# Patient Record
Sex: MALE | Race: WHITE | NOT HISPANIC OR LATINO | Employment: FULL TIME | URBAN - METROPOLITAN AREA
[De-identification: names, ages, dates, MRNs, and addresses within clinical notes are randomized per-mention and may not be internally consistent; named-entity substitution may affect disease eponyms.]

---

## 2020-01-24 ENCOUNTER — OFFICE VISIT (OUTPATIENT)
Dept: FAMILY MEDICINE | Facility: CLINIC | Age: 55
End: 2020-01-24
Payer: MEDICAID

## 2020-01-24 VITALS
BODY MASS INDEX: 33.55 KG/M2 | TEMPERATURE: 99 F | SYSTOLIC BLOOD PRESSURE: 140 MMHG | OXYGEN SATURATION: 98 % | DIASTOLIC BLOOD PRESSURE: 80 MMHG | HEIGHT: 72 IN | HEART RATE: 97 BPM | WEIGHT: 247.69 LBS

## 2020-01-24 DIAGNOSIS — R55 SYNCOPE, UNSPECIFIED SYNCOPE TYPE: ICD-10-CM

## 2020-01-24 DIAGNOSIS — R42 DIZZINESS: ICD-10-CM

## 2020-01-24 DIAGNOSIS — Z76.89 ENCOUNTER TO ESTABLISH CARE: ICD-10-CM

## 2020-01-24 DIAGNOSIS — R00.2 PALPITATIONS: ICD-10-CM

## 2020-01-24 DIAGNOSIS — E78.5 HYPERLIPIDEMIA, UNSPECIFIED HYPERLIPIDEMIA TYPE: ICD-10-CM

## 2020-01-24 DIAGNOSIS — I10 ESSENTIAL HYPERTENSION: Primary | ICD-10-CM

## 2020-01-24 PROCEDURE — 93000 PR ELECTROCARDIOGRAM, COMPLETE: ICD-10-PCS | Mod: S$GLB,,, | Performed by: NURSE PRACTITIONER

## 2020-01-24 PROCEDURE — 93000 ELECTROCARDIOGRAM COMPLETE: CPT | Mod: S$GLB,,, | Performed by: NURSE PRACTITIONER

## 2020-01-24 PROCEDURE — 99204 PR OFFICE/OUTPT VISIT, NEW, LEVL IV, 45-59 MIN: ICD-10-PCS | Mod: S$GLB,,, | Performed by: NURSE PRACTITIONER

## 2020-01-24 PROCEDURE — 99204 OFFICE O/P NEW MOD 45 MIN: CPT | Mod: S$GLB,,, | Performed by: NURSE PRACTITIONER

## 2020-01-24 RX ORDER — LISINOPRIL AND HYDROCHLOROTHIAZIDE 12.5; 2 MG/1; MG/1
1 TABLET ORAL DAILY
COMMUNITY
Start: 2020-01-15 | End: 2020-10-02 | Stop reason: SDUPTHER

## 2020-01-24 RX ORDER — ROSUVASTATIN CALCIUM 20 MG/1
20 TABLET, COATED ORAL DAILY
COMMUNITY
Start: 2019-10-28 | End: 2021-08-25

## 2020-01-24 RX ORDER — ASPIRIN 81 MG/1
81 TABLET ORAL
COMMUNITY
End: 2021-08-25

## 2020-01-24 NOTE — PROGRESS NOTES
"Clinic Note  1/24/2020      Subjective:       Patient ID:  Ruslan is a 54 y.o. male being seen in office today to establish care.       Chief Complaint: Establish Care    Mr. Dozier is here today with his mother at side to establish care. His previous PCP was Dr. Langston at [facility]. Hx of HTN, hyperlipidemia. He is here today with complaints of his "heart racing", dizziness, and fainting spells. He states these episodes happen when he exerts himself but also happen with no warning while sitting. He also reports nausea associated with dizziness. He states his pulse will go from "normal to 130" for no reason at all.  He reports these issues have been going on for around two years but he started seeing someone about them in August 2019. He reports he has had a brain MRI, Abd CT, nuclear stress test, tilt table test, echocardiogram, and blood work at [facility] and was told everything was normal. Recent lab work performed at [facility]. Cardiologist is Dr. Prado. He has also been seen at [facility] and [facility] for same complaints and discharged home with no explanation for symptoms. He has been instructed not to drive by prior PCP and is currently unable to work due to his condition. He is requesting to have a coronary angiogram and second opinion. He also says he was referred to an ENT but never received an appointment. Recent eye exam; needs cataract surgery but cannot be cleared due to symptoms.     Family History   Problem Relation Age of Onset    Diabetes Mother     Heart disease Father      Social History     Socioeconomic History    Marital status:      Spouse name: Not on file    Number of children: Not on file    Years of education: Not on file    Highest education level: Not on file   Occupational History    Not on file   Social Needs    Financial resource strain: Not on file    Food insecurity:     Worry: Not on file     Inability: Not on file    Transportation needs:     " Medical: Not on file     Non-medical: Not on file   Tobacco Use    Smoking status: Smoker, Current Status Unknown    Smokeless tobacco: Never Used   Substance and Sexual Activity    Alcohol use: Not Currently    Drug use: Not Currently    Sexual activity: Not on file   Lifestyle    Physical activity:     Days per week: Not on file     Minutes per session: Not on file    Stress: Not on file   Relationships    Social connections:     Talks on phone: Not on file     Gets together: Not on file     Attends Taoist service: Not on file     Active member of club or organization: Not on file     Attends meetings of clubs or organizations: Not on file     Relationship status: Not on file   Other Topics Concern    Not on file   Social History Narrative    Not on file     History reviewed. No pertinent surgical history.  Social History     Substance and Sexual Activity   Alcohol Use Not Currently     Patient has no known allergies.  Medication List with Changes/Refills   Current Medications    ASPIRIN (ECOTRIN) 81 MG EC TABLET    Take 81 mg by mouth once daily.    LISINOPRIL-HYDROCHLOROTHIAZIDE (PRINZIDE,ZESTORETIC) 20-12.5 MG PER TABLET    Take 1 tablet by mouth once daily.     ROSUVASTATIN (CRESTOR) 20 MG TABLET    Take 20 mg by mouth once daily.        Review of Systems   Constitutional: Negative for chills, fever and weight loss.   HENT: Positive for hearing loss. Negative for congestion, sinus pain and sore throat.    Eyes: Negative for blurred vision, double vision and photophobia.   Respiratory: Negative for cough, shortness of breath and wheezing.    Cardiovascular: Positive for palpitations. Negative for chest pain and leg swelling.   Gastrointestinal: Negative for abdominal pain, blood in stool, constipation, diarrhea, heartburn, nausea and vomiting.   Genitourinary: Negative for frequency and urgency.   Musculoskeletal: Negative for falls, joint pain and neck pain.   Skin: Negative for rash.    Neurological: Positive for dizziness. Negative for tingling, tremors, sensory change, speech change, focal weakness, seizures, loss of consciousness, weakness and headaches.   Psychiatric/Behavioral: Negative for depression, memory loss and suicidal ideas.             BP (!) 140/80 (BP Location: Right arm, Patient Position: Sitting, BP Method: Large (Manual))   Pulse 97   Temp 99 °F (37.2 °C) (Oral)   Ht 6' (1.829 m)   Wt 112.4 kg (247 lb 11.2 oz)   SpO2 98%   BMI 33.59 kg/m²   Estimated body mass index is 33.59 kg/m² as calculated from the following:    Height as of this encounter: 6' (1.829 m).    Weight as of this encounter: 112.4 kg (247 lb 11.2 oz).    Objective:        Physical Exam   Constitutional: He is oriented to person, place, and time and well-developed, well-nourished, and in no distress.   HENT:   Head: Normocephalic and atraumatic.   Right Ear: Tympanic membrane, external ear and ear canal normal.   Left Ear: Tympanic membrane, external ear and ear canal normal.   Nose: Nose normal.   Mouth/Throat: Uvula is midline, oropharynx is clear and moist and mucous membranes are normal.   Eyes: Pupils are equal, round, and reactive to light. Conjunctivae and EOM are normal.   Neck: Trachea normal and normal range of motion. Neck supple. No JVD present. Carotid bruit is not present.   Cardiovascular: Normal rate, regular rhythm, normal heart sounds and intact distal pulses. Exam reveals no gallop and no friction rub.   No murmur heard.  EKG in office: NSR   Pulmonary/Chest: Effort normal and breath sounds normal. No respiratory distress. He has no wheezes. He has no rhonchi. He has no rales.   Abdominal: Soft. Bowel sounds are normal. He exhibits no distension. There is no tenderness. There is no CVA tenderness.   Musculoskeletal: Normal range of motion. He exhibits no edema, tenderness or deformity.   Lymphadenopathy:     He has no cervical adenopathy.   Neurological: He is alert and oriented to  person, place, and time. He has normal motor skills, normal sensation, normal strength, normal reflexes and intact cranial nerves. No cranial nerve deficit. Gait normal. GCS score is 15.   Skin: Skin is warm, dry and intact. No rash noted. No erythema.   Psychiatric: Mood, memory, affect and judgment normal.   Nursing note and vitals reviewed.        Assessment and Plan:         Ruslan was seen today for establish care.    Diagnoses and all orders for this visit:    Essential hypertension  -     US Carotid Bilateral; Future  -     US Carotid Bilateral    Hyperlipidemia, unspecified hyperlipidemia type  -     US Carotid Bilateral; Future  -     US Carotid Bilateral    Encounter to establish care    Dizziness  -     US Carotid Bilateral; Future  -     US Carotid Bilateral    Syncope, unspecified syncope type  -     US Carotid Bilateral; Future  -     US Carotid Bilateral    Palpitations  -     IN OFFICE EKG 12-LEAD (to Muse); Future    Discussed with patient getting all medical records so we an review what all test have and have not been done. He is to request records from [facility] and [facility]. Also is to find out ENT physicians name that will accept Medicaid so we can send referral; discussed possible BPV/inner ear problem.  He cannot [facility] due to transportation issues.  Instructed to schedule follow-up once carotid U/S complete and all records received so we can develop further plan of care. Possible referral to Dr. Jeff/cardiology for 2nd opinion.   Pt would like to wait for all preventative screening, colonoscopy, etc until these current issues are resolved.  Given ER precautions. Patient and family verbalized understanding and agreed to treatment and plan of care.       Follow up:   Follow up in about 1 month (around 2/24/2020), or sooner if medical records obtained..            Hermila Santana NP

## 2020-01-27 DIAGNOSIS — R42 DIZZINESS: ICD-10-CM

## 2020-01-27 DIAGNOSIS — E78.5 HYPERLIPIDEMIA, UNSPECIFIED HYPERLIPIDEMIA TYPE: ICD-10-CM

## 2020-01-27 DIAGNOSIS — R55 SYNCOPE, UNSPECIFIED SYNCOPE TYPE: Primary | ICD-10-CM

## 2020-01-29 DIAGNOSIS — R42 DIZZINESS: ICD-10-CM

## 2020-01-29 DIAGNOSIS — R55 SYNCOPE, UNSPECIFIED SYNCOPE TYPE: Primary | ICD-10-CM

## 2020-01-30 ENCOUNTER — TELEPHONE (OUTPATIENT)
Dept: FAMILY MEDICINE | Facility: CLINIC | Age: 55
End: 2020-01-30

## 2020-01-30 NOTE — TELEPHONE ENCOUNTER
Pt called to advise he found an ENT at [facility]. Dr Grigsby and the fax is [phone]

## 2020-02-24 ENCOUNTER — OFFICE VISIT (OUTPATIENT)
Dept: FAMILY MEDICINE | Facility: CLINIC | Age: 55
End: 2020-02-24
Payer: MEDICAID

## 2020-02-24 VITALS
TEMPERATURE: 99 F | SYSTOLIC BLOOD PRESSURE: 138 MMHG | HEIGHT: 72 IN | OXYGEN SATURATION: 96 % | WEIGHT: 244.88 LBS | BODY MASS INDEX: 33.17 KG/M2 | DIASTOLIC BLOOD PRESSURE: 88 MMHG | HEART RATE: 99 BPM

## 2020-02-24 DIAGNOSIS — R00.2 HEART PALPITATIONS: ICD-10-CM

## 2020-02-24 DIAGNOSIS — I10 ESSENTIAL HYPERTENSION: Primary | ICD-10-CM

## 2020-02-24 DIAGNOSIS — R42 DIZZINESS: ICD-10-CM

## 2020-02-24 DIAGNOSIS — E78.5 HYPERLIPIDEMIA, UNSPECIFIED HYPERLIPIDEMIA TYPE: ICD-10-CM

## 2020-02-24 PROBLEM — Z76.89 ENCOUNTER TO ESTABLISH CARE: Status: RESOLVED | Noted: 2020-01-24 | Resolved: 2020-02-24

## 2020-02-24 PROCEDURE — 99213 OFFICE O/P EST LOW 20 MIN: CPT | Mod: S$GLB,,, | Performed by: NURSE PRACTITIONER

## 2020-02-24 PROCEDURE — 99213 PR OFFICE/OUTPT VISIT, EST, LEVL III, 20-29 MIN: ICD-10-PCS | Mod: S$GLB,,, | Performed by: NURSE PRACTITIONER

## 2020-02-24 NOTE — PROGRESS NOTES
"Clinic Note  2/24/2020      Subjective:       Patient ID:  Ruslan is a 54 y.o. male being seen in office today as an established patient.     Chief Complaint: Follow-up    Mr. Dozier is here today for follow-up; he has a hx of HTN and hyperlipidemia. He has had an extensive work-up for complaints of "heart racing", dizziness, and fainting spells. He states these episodes happen when he exerts himself but also happen with no warning while sitting. He states his pulse will go from "normal to 130" for no reason at all and "his face will get red". He has had an MRI of the brain, Abd CT, nuclear stress test, tilt table test, holter monitor, echocardiogram, and blood work all recently at [facility]. These records were provided by the patient today and reviewed at this encounter, no remarkable findings. He also recently had a carotid ultrasound that was negative, as part of his dizziness work-up. He was referred to ENT to rule out inner ear/ENT problems and has recently seen Dr. Mcgowan. 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 He has an MRI of his inner ear scheduled for tomorrow. The patient is still reporting  "heart rate"  increasing from "normal to 117" recently after moving a picnic table. He has been checking his heart rate regularly.  He states last time he wore the holter monitor he did not do any strenuous activity, he just "sat around". He continues to request "an angiogram" of his heart.   He reports he cannot work or drive in this condition, and has been out of work for many months. He is also scheduled for cataract surgery on 03/13/2020.        Family History   Problem Relation Age of Onset    Diabetes Mother     Heart disease Father      History reviewed. No pertinent surgical history.  Social History     Substance and Sexual Activity "   Alcohol Use Not Currently     Patient has no known allergies.  Medication List with Changes/Refills   Current Medications    ASPIRIN (ECOTRIN) 81 MG EC TABLET    Take 81 mg by mouth once daily.    LISINOPRIL-HYDROCHLOROTHIAZIDE (PRINZIDE,ZESTORETIC) 20-12.5 MG PER TABLET    Take 1 tablet by mouth once daily.     ROSUVASTATIN (CRESTOR) 20 MG TABLET    Take 20 mg by mouth once daily.        Review of Systems   Constitutional: Negative for appetite change, diaphoresis, fatigue and fever.   HENT: Positive for hearing loss. Negative for congestion, ear pain, postnasal drip, rhinorrhea, sinus pressure, sinus pain and sore throat.    Respiratory: Negative for cough, shortness of breath, wheezing and stridor.    Cardiovascular: Positive for palpitations. Negative for chest pain and leg swelling.   Gastrointestinal: Negative for abdominal pain, blood in stool, constipation, diarrhea, nausea and vomiting.   Genitourinary: Negative for flank pain, frequency, hematuria and urgency.   Musculoskeletal: Negative for gait problem, joint swelling and myalgias.   Skin: Negative for color change and rash.   Neurological: Positive for dizziness. Negative for tremors, seizures, speech difficulty, weakness, numbness and headaches.   Psychiatric/Behavioral: Negative for confusion, decreased concentration, hallucinations and sleep disturbance. The patient is not nervous/anxious.               /88 (BP Location: Left arm, Patient Position: Sitting, BP Method: Large (Manual))   Pulse 99   Temp 98.8 °F (37.1 °C) (Oral)   Ht 6' (1.829 m)   Wt 111.1 kg (244 lb 14.4 oz)   SpO2 96%   BMI 33.21 kg/m²   Estimated body mass index is 33.21 kg/m² as calculated from the following:    Height as of this encounter: 6' (1.829 m).    Weight as of this encounter: 111.1 kg (244 lb 14.4 oz).    Objective:        Physical Exam   Constitutional: He is oriented to person, place, and time. He appears well-developed and well-nourished.   HENT:   Head:  Normocephalic and atraumatic.   Right Ear: Tympanic membrane normal.   Left Ear: Tympanic membrane normal.   Nose: Nose normal.   Mouth/Throat: Uvula is midline, oropharynx is clear and moist and mucous membranes are normal. No oropharyngeal exudate, posterior oropharyngeal edema or posterior oropharyngeal erythema.   Eyes: Pupils are equal, round, and reactive to light. Conjunctivae and EOM are normal.   Neck: Trachea normal, normal range of motion and full passive range of motion without pain. Neck supple. No JVD present. Carotid bruit is not present. No thyroid mass and no thyromegaly present.   Cardiovascular: Normal rate, regular rhythm and intact distal pulses. Exam reveals no gallop and no friction rub.   No murmur heard.  Pulmonary/Chest: Effort normal and breath sounds normal. No stridor. No respiratory distress. He has no wheezes. He has no rhonchi. He has no rales.   Abdominal: Soft. Bowel sounds are normal. He exhibits no distension, no abdominal bruit and no mass. There is no tenderness. There is no CVA tenderness.   Musculoskeletal: Normal range of motion.   Lymphadenopathy:     He has no cervical adenopathy.   Neurological: He is alert and oriented to person, place, and time. He has normal strength. No cranial nerve deficit or sensory deficit.   Skin: Skin is warm, dry and intact. Capillary refill takes less than 2 seconds. No rash noted.   Psychiatric: He has a normal mood and affect. His speech is normal and behavior is normal. Judgment and thought content normal. His mood appears not anxious. Cognition and memory are normal. He does not exhibit a depressed mood. He expresses no suicidal ideation. He expresses no suicidal plans and no homicidal plans.   Nursing note and vitals reviewed.         Assessment and Plan:         Ruslan was seen today for follow-up.    Diagnoses and all orders for this visit:    Essential hypertension  Comments:  Keep B/P log and bring to Follow-up  Continue  Lisinopril/HCTZ 20/12.5mg    Heart palpitations  -     Holter monitor - 48 hour; Future  -     Holter monitor - 48 hour    Dizziness  Comments:  Keep ENT F/U  Orders:  -     Holter monitor - 48 hour; Future  -     Holter monitor - 48 hour    Hyperlipidemia, unspecified hyperlipidemia type  Comments:  Continue Crestor 20mg daily    Reviewed recent echo, nuclear stress test, tilt table test, holter monitor, and labs all performed at [facility] with patient at this encounter; all diagnostic testing unremarkable. Explained to patient that there is no indication for a coronary angiogram since stress test was unremarkable.     Educated pt that an increase in heart rate is a normal response to exercise or strenuous activity; since it is continuing to happen at rest we discussed repeating 48-hour holter monitor, and instructed to carry out daily routine as normal while wearing. If no arhythmia/abnormality noted discussed trial of low dose Metoprolol XL. Results from ENT work-up pending.    After reviewing all recent imaging, labs, and ER records he appears to be clear from a primary care standpoint for cataract surgery unless something remarkable shows on repeat Holter or any abnormal ENT findings.   Patient verbalized understanding and agreed to treatment and plan of care.        Follow up:   Follow up in about 1 month (around 3/24/2020), or sooner if needed.            Hermila Santana NP

## 2020-03-19 ENCOUNTER — OFFICE VISIT (OUTPATIENT)
Dept: FAMILY MEDICINE | Facility: CLINIC | Age: 55
End: 2020-03-19
Payer: MEDICAID

## 2020-03-19 VITALS
BODY MASS INDEX: 33.63 KG/M2 | DIASTOLIC BLOOD PRESSURE: 82 MMHG | OXYGEN SATURATION: 96 % | HEART RATE: 90 BPM | WEIGHT: 248 LBS | TEMPERATURE: 99 F | SYSTOLIC BLOOD PRESSURE: 130 MMHG

## 2020-03-19 DIAGNOSIS — E78.5 HYPERLIPIDEMIA, UNSPECIFIED HYPERLIPIDEMIA TYPE: ICD-10-CM

## 2020-03-19 DIAGNOSIS — R00.2 PALPITATIONS: Primary | ICD-10-CM

## 2020-03-19 DIAGNOSIS — I10 ESSENTIAL HYPERTENSION: ICD-10-CM

## 2020-03-19 DIAGNOSIS — R42 DIZZINESS: ICD-10-CM

## 2020-03-19 PROCEDURE — 99213 OFFICE O/P EST LOW 20 MIN: CPT | Mod: S$GLB,,, | Performed by: NURSE PRACTITIONER

## 2020-03-19 PROCEDURE — 99213 PR OFFICE/OUTPT VISIT, EST, LEVL III, 20-29 MIN: ICD-10-PCS | Mod: S$GLB,,, | Performed by: NURSE PRACTITIONER

## 2020-03-19 RX ORDER — METOPROLOL SUCCINATE 25 MG/1
25 TABLET, EXTENDED RELEASE ORAL DAILY
Qty: 30 TABLET | Refills: 5 | Status: SHIPPED | OUTPATIENT
Start: 2020-03-19 | End: 2020-12-04 | Stop reason: SDUPTHER

## 2020-03-19 NOTE — PROGRESS NOTES
Clinic Note  3/19/2020      Subjective:       Patient ID:  Ruslan is a 55 y.o. male being seen in office today as an established patient.     Chief Complaint: Follow-up    Mr. Dozier is here today for follow-up after having a second 48 hour holter monitor. Results reviewed with pt at this encounter, the pt's symptoms did not correlate with any arrhythmia. He states he is still having dizzy episodes and episodes where his heart rate jumps up to 130. He has had full work up and been cleared by cardiology with no remarkable findings.  He is seeing Dr. Mcgowan who performed MRI of inner ear which was unremarkable. He does report he was diagnosed with severe sinus issues and was prescribed a medication that is helping that he does not know the name of. At our last encounter we discussed a possible trial of low dose Metoprolol XL to see if his pulse increasing resolves. He would like to try this today. He also recently had cataract surgery on March 13, 2020 of his right eye and reports good results.     Family History   Problem Relation Age of Onset    Diabetes Mother     Heart disease Father      History reviewed. No pertinent surgical history.  Social History     Substance and Sexual Activity   Alcohol Use Not Currently     Patient has no known allergies.  Medication List with Changes/Refills   New Medications    METOPROLOL SUCCINATE (TOPROL-XL) 25 MG 24 HR TABLET    Take 1 tablet (25 mg total) by mouth once daily.   Current Medications    ASPIRIN (ECOTRIN) 81 MG EC TABLET    Take 81 mg by mouth once daily.    LISINOPRIL-HYDROCHLOROTHIAZIDE (PRINZIDE,ZESTORETIC) 20-12.5 MG PER TABLET    Take 1 tablet by mouth once daily.     ROSUVASTATIN (CRESTOR) 20 MG TABLET    Take 20 mg by mouth once daily.        Review of Systems   Constitutional: Negative for appetite change, diaphoresis, fatigue and fever.   HENT: Positive for hearing loss. Negative for congestion, ear pain, postnasal drip, rhinorrhea, sinus pressure, sinus  pain and sore throat.    Respiratory: Negative for cough, shortness of breath, wheezing and stridor.    Cardiovascular: Positive for palpitations. Negative for chest pain and leg swelling.   Gastrointestinal: Negative for abdominal pain, blood in stool, constipation, diarrhea, nausea and vomiting.   Genitourinary: Negative for flank pain, frequency, hematuria and urgency.   Musculoskeletal: Negative for gait problem, joint swelling and myalgias.   Skin: Negative for color change and rash.   Neurological: Positive for dizziness. Negative for tremors, seizures, speech difficulty, weakness, numbness and headaches.   Psychiatric/Behavioral: Negative for confusion, decreased concentration, hallucinations and sleep disturbance. The patient is not nervous/anxious.               /82 (BP Location: Left arm, Patient Position: Sitting, BP Method: Large (Manual))   Pulse 90   Temp 98.8 °F (37.1 °C) (Oral)   Wt 112.5 kg (248 lb)   SpO2 96%   BMI 33.63 kg/m²   Estimated body mass index is 33.63 kg/m² as calculated from the following:    Height as of 2/24/20: 6' (1.829 m).    Weight as of this encounter: 112.5 kg (248 lb).    Objective:        Physical Exam   Constitutional: He is oriented to person, place, and time. He appears well-developed and well-nourished.   HENT:   Head: Normocephalic and atraumatic.   Right Ear: Tympanic membrane normal.   Left Ear: Tympanic membrane normal.   Nose: Nose normal.   Mouth/Throat: Uvula is midline, oropharynx is clear and moist and mucous membranes are normal. No oropharyngeal exudate, posterior oropharyngeal edema or posterior oropharyngeal erythema.   Eyes: Pupils are equal, round, and reactive to light. Conjunctivae and EOM are normal.   Neck: Trachea normal, normal range of motion and full passive range of motion without pain. Neck supple. No JVD present. Carotid bruit is not present. No thyroid mass and no thyromegaly present.   Cardiovascular: Normal rate, regular rhythm and  intact distal pulses. Exam reveals no gallop and no friction rub.   No murmur heard.  Pulmonary/Chest: Effort normal and breath sounds normal. No stridor. No respiratory distress. He has no wheezes. He has no rhonchi. He has no rales.   Abdominal: Soft. Bowel sounds are normal. He exhibits no distension, no abdominal bruit and no mass. There is no tenderness. There is no CVA tenderness.   Musculoskeletal: Normal range of motion.   Lymphadenopathy:     He has no cervical adenopathy.   Neurological: He is alert and oriented to person, place, and time. He has normal strength. No cranial nerve deficit or sensory deficit.   Skin: Skin is warm, dry and intact. Capillary refill takes less than 2 seconds. No rash noted.   Psychiatric: He has a normal mood and affect. His speech is normal and behavior is normal. Judgment and thought content normal. His mood appears not anxious. Cognition and memory are normal. He does not exhibit a depressed mood. He expresses no suicidal ideation. He expresses no suicidal plans and no homicidal plans.   Nursing note and vitals reviewed.         Assessment and Plan:         Ruslan was seen today for follow-up.    Diagnoses and all orders for this visit:    Palpitations  -     metoprolol succinate (TOPROL-XL) 25 MG 24 hr tablet; Take 1 tablet (25 mg total) by mouth once daily.    Essential hypertension  Comments:  Continue current    Dizziness  Comments:  Keep F/u with ENT.    Hyperlipidemia, unspecified hyperlipidemia type  Comments:  Continue current     Trial of Metoprolol XL for palpitations and increased heart rate. Patient verbalized understanding and agreed to treatment and plan of care.      Follow up:   Follow up in about 3 months (around 6/19/2020), or sooner if needed.            Hermila Santana NP

## 2020-03-27 ENCOUNTER — TELEPHONE (OUTPATIENT)
Dept: FAMILY MEDICINE | Facility: CLINIC | Age: 55
End: 2020-03-27

## 2020-03-27 NOTE — TELEPHONE ENCOUNTER
Pt called to advise you of his pulse rate being on an avergae of 92-94 since starting the medication so he was wanting to know about a release.

## 2020-06-22 ENCOUNTER — OFFICE VISIT (OUTPATIENT)
Dept: FAMILY MEDICINE | Facility: CLINIC | Age: 55
End: 2020-06-22
Payer: MEDICAID

## 2020-06-22 ENCOUNTER — TELEPHONE (OUTPATIENT)
Dept: SURGERY | Facility: CLINIC | Age: 55
End: 2020-06-22

## 2020-06-22 VITALS
DIASTOLIC BLOOD PRESSURE: 80 MMHG | TEMPERATURE: 99 F | HEART RATE: 87 BPM | OXYGEN SATURATION: 97 % | BODY MASS INDEX: 32.54 KG/M2 | WEIGHT: 245.5 LBS | SYSTOLIC BLOOD PRESSURE: 130 MMHG | HEIGHT: 73 IN

## 2020-06-22 DIAGNOSIS — Z12.11 SCREENING FOR COLON CANCER: ICD-10-CM

## 2020-06-22 DIAGNOSIS — I10 ESSENTIAL HYPERTENSION: Primary | ICD-10-CM

## 2020-06-22 DIAGNOSIS — Z12.5 SCREENING FOR PROSTATE CANCER: ICD-10-CM

## 2020-06-22 DIAGNOSIS — H91.91 DEAFNESS IN RIGHT EAR: ICD-10-CM

## 2020-06-22 DIAGNOSIS — E78.5 HYPERLIPIDEMIA, UNSPECIFIED HYPERLIPIDEMIA TYPE: ICD-10-CM

## 2020-06-22 PROBLEM — R42 DIZZINESS: Status: RESOLVED | Noted: 2020-02-24 | Resolved: 2020-06-22

## 2020-06-22 PROCEDURE — 99213 PR OFFICE/OUTPT VISIT, EST, LEVL III, 20-29 MIN: ICD-10-PCS | Mod: S$GLB,,, | Performed by: NURSE PRACTITIONER

## 2020-06-22 PROCEDURE — 99213 OFFICE O/P EST LOW 20 MIN: CPT | Mod: S$GLB,,, | Performed by: NURSE PRACTITIONER

## 2020-06-22 NOTE — PROGRESS NOTES
"Clinic Note  6/22/2020      Subjective:       Patient ID:  Ruslan is a 55 y.o. male being seen in office today as an established patient.     Chief Complaint: Follow-up    Mr. Dozier is here today for 3 month follow-up for HTN, palpitations, and hyperlipidemia.    HTN-compliant with Prinzide 20/12.5mg. WNL in office today. Denies headaches, CP, blurred vision.    Palpitations-He has had full work up and been cleared by cardiology with no remarkable findings. At his last encounter we started him on Metoprolol XL 25mg and it states this has helped with palpitations tremendously. He no longer is having dizzy episodes or feeling where his heart rate "jumps to 130".     HLD-Compliant with daily Crestor 20mg, due for lipid recheck    He is also seeing Dr. Mcgowan, ENT who performed MRI of inner ear which was unremarkable. He had multiple hearing test that determined he is deaf in his right ear. He was told a hearing aid will not help    He also recently had a sleep study ordered by Dr. Mcgowan. It was positive for LOUIS and has been wearing CPAP for about 2 weeks. Reports much better sleep.     Prevention-due for PSA, also needs colonoscopy-no prior colon cancer screening.     Family History   Problem Relation Age of Onset    Diabetes Mother     Heart disease Father      Past Surgical History:   Procedure Laterality Date    CATARACT EXTRACTION Right      Social History     Substance and Sexual Activity   Alcohol Use Not Currently     Patient has no known allergies.  Medication List with Changes/Refills   Current Medications    ASPIRIN (ECOTRIN) 81 MG EC TABLET    Take 81 mg by mouth. Every other day    LISINOPRIL-HYDROCHLOROTHIAZIDE (PRINZIDE,ZESTORETIC) 20-12.5 MG PER TABLET    Take 1 tablet by mouth once daily.     METOPROLOL SUCCINATE (TOPROL-XL) 25 MG 24 HR TABLET    Take 1 tablet (25 mg total) by mouth once daily.    ROSUVASTATIN (CRESTOR) 20 MG TABLET    Take 20 mg by mouth once daily.        Review of Systems " "  Constitutional: Negative for appetite change, diaphoresis, fatigue and fever.   HENT: Positive for hearing loss. Negative for congestion, ear pain, postnasal drip, rhinorrhea, sinus pressure, sinus pain and sore throat.    Respiratory: Negative for cough, shortness of breath, wheezing and stridor.    Cardiovascular: Negative for chest pain, palpitations and leg swelling.   Gastrointestinal: Negative for abdominal pain, blood in stool, constipation, diarrhea, nausea and vomiting.   Genitourinary: Negative for flank pain, frequency, hematuria and urgency.   Musculoskeletal: Negative for gait problem, joint swelling and myalgias.   Skin: Negative for color change and rash.   Neurological: Negative for dizziness, tremors, seizures, speech difficulty, weakness, numbness and headaches.   Psychiatric/Behavioral: Negative for confusion, decreased concentration, hallucinations and sleep disturbance. The patient is not nervous/anxious.               /80 (BP Location: Left arm, Patient Position: Sitting, BP Method: Large (Manual))   Pulse 87   Temp 98.8 °F (37.1 °C) (Oral)   Ht 6' 1" (1.854 m)   Wt 111.4 kg (245 lb 8 oz)   SpO2 97%   BMI 32.39 kg/m²   Estimated body mass index is 32.39 kg/m² as calculated from the following:    Height as of this encounter: 6' 1" (1.854 m).    Weight as of this encounter: 111.4 kg (245 lb 8 oz).    Objective:        Physical Exam  Vitals signs and nursing note reviewed.   Constitutional:       Appearance: He is well-developed.   HENT:      Head: Normocephalic and atraumatic.      Nose: Nose normal.      Mouth/Throat:      Pharynx: Uvula midline. No oropharyngeal exudate or posterior oropharyngeal erythema.   Eyes:      Conjunctiva/sclera: Conjunctivae normal.      Pupils: Pupils are equal, round, and reactive to light.   Neck:      Musculoskeletal: Full passive range of motion without pain, normal range of motion and neck supple.      Thyroid: No thyroid mass or thyromegaly.      " Vascular: No carotid bruit or JVD.      Trachea: Trachea normal.   Cardiovascular:      Rate and Rhythm: Normal rate and regular rhythm.      Heart sounds: No murmur. No friction rub. No gallop.    Pulmonary:      Effort: Pulmonary effort is normal. No respiratory distress.      Breath sounds: Normal breath sounds. No stridor. No wheezing, rhonchi or rales.   Abdominal:      General: Bowel sounds are normal. There is no distension or abdominal bruit.      Palpations: Abdomen is soft. There is no mass.      Tenderness: There is no abdominal tenderness.   Musculoskeletal: Normal range of motion.   Lymphadenopathy:      Cervical: No cervical adenopathy.   Skin:     General: Skin is warm and dry.      Capillary Refill: Capillary refill takes less than 2 seconds.      Findings: No rash.   Neurological:      Mental Status: He is alert and oriented to person, place, and time.      Cranial Nerves: No cranial nerve deficit.      Sensory: No sensory deficit.   Psychiatric:         Mood and Affect: Mood is not anxious or depressed.         Speech: Speech normal.         Behavior: Behavior normal.         Thought Content: Thought content normal. Thought content does not include suicidal ideation. Thought content does not include homicidal or suicidal plan.         Judgment: Judgment normal.            Assessment and Plan:         Ruslan was seen today for follow-up.    Diagnoses and all orders for this visit:    Essential hypertension  Comments:  Continue Prinzide 20-12.5mg daily    Hyperlipidemia, unspecified hyperlipidemia type  Comments:  Continue Crestor 20mg  Orders:  -     Lipid Panel; Future  -     Lipid Panel    Screening for prostate cancer  -     PSA, Screening; Future  -     PSA, Screening    Screening for colon cancer  -     Ambulatory referral/consult to General Surgery; Future    Deafness in right ear     Recheck lipids and get PSA.  Will call patient with results developed further plan of care at that time.   Instructed patient if he has not heard from us in 3-5 business days after having drawn to let us know.  Referral for colon cancer screening.  Patient verbalized understanding and agreed to treatment and plan of care.      Follow up:   Follow up in about 3 months (around 9/22/2020), or sooner if needed.            Hermila Santana, NP

## 2020-10-02 RX ORDER — LISINOPRIL AND HYDROCHLOROTHIAZIDE 12.5; 2 MG/1; MG/1
1 TABLET ORAL DAILY
Qty: 30 TABLET | Refills: 2 | Status: SHIPPED | OUTPATIENT
Start: 2020-10-02 | End: 2020-12-07 | Stop reason: SDUPTHER

## 2020-10-02 NOTE — TELEPHONE ENCOUNTER
----- Message from Lynda Mercer sent at 10/2/2020  2:15 PM CDT -----  Regarding: Refill  Type:  RX Refill Request    Who Called: Ruslan Dozier   Refill or New Rx: Refill  RX Name and Strength:lisinopril-hydrochlorothiazide (PRINZIDE,ZESTORETIC) 20-12.5 mg per tablet  How is the patient currently taking it? (ex. 1XDay):  Is this a 30 day or 90 day RX:  Preferred Pharmacy with phone number:[id number] - [id number], [place] - [address];  Local or Mail Order:  Ordering Provider:  Would the patient rather a call back or a response via [facility]?  Call back   Best Call Back Number: [phone]  Additional Information: please call to confirm refill. Pt. Ruslan Dozier has been calling 3 days and states he is out of medication

## 2020-12-04 DIAGNOSIS — R00.2 PALPITATIONS: ICD-10-CM

## 2020-12-04 RX ORDER — METOPROLOL SUCCINATE 25 MG/1
25 TABLET, EXTENDED RELEASE ORAL DAILY
Qty: 30 TABLET | Refills: 5 | Status: SHIPPED | OUTPATIENT
Start: 2020-12-04 | End: 2020-12-07 | Stop reason: SDUPTHER

## 2020-12-04 NOTE — TELEPHONE ENCOUNTER
----- Message from Rima Almanza sent at 12/4/2020  1:44 PM CST -----  Regarding: patient refill  Contact: patient's wife  Type:  RX Refill Request    Who Called: patient  Refill or New Rx:patient  RX Name and Strength: metoprolol succinate (TOPROL-XL) 25 MG 24 hr tablet  How is the patient currently taking it? (ex. 1XDay):1xday  Is this a 30 day or 90 day RX:30day  Preferred Pharmacy with phone number:  [facility] [address] [place] [address] - [address] [place] [address]  [address] [facility] [address]  Phone: [phone] Fax: [phone]  Local or Mail Order:local  Ordering Provider:Hermila Santana  Would the patient rather a call back or a response via [facility]? Call back  Best Call Back Number:[phone]   Additional Information: pt is completely out

## 2020-12-07 DIAGNOSIS — I10 ESSENTIAL HYPERTENSION: Primary | ICD-10-CM

## 2020-12-07 DIAGNOSIS — R00.2 PALPITATIONS: ICD-10-CM

## 2020-12-07 RX ORDER — METOPROLOL SUCCINATE 25 MG/1
25 TABLET, EXTENDED RELEASE ORAL DAILY
Qty: 30 TABLET | Refills: 5 | Status: SHIPPED | OUTPATIENT
Start: 2020-12-07 | End: 2021-08-25 | Stop reason: SDUPTHER

## 2020-12-07 RX ORDER — LISINOPRIL AND HYDROCHLOROTHIAZIDE 12.5; 2 MG/1; MG/1
1 TABLET ORAL DAILY
Qty: 30 TABLET | Refills: 5 | Status: SHIPPED | OUTPATIENT
Start: 2020-12-07 | End: 2021-08-25 | Stop reason: SDUPTHER

## 2020-12-07 NOTE — TELEPHONE ENCOUNTER
----- Message from Aline Khoury sent at 12/7/2020  2:42 PM CST -----  Patient states he left several messages he is out of his medication and need refills,  metoprolol succinate (TOPROL-XL) 25 MG 24 hr tablet and   lisinopriL-hydrochlorothiazide (PRINZIDE,ZESTORETIC) 20-12.5 mg per tablet      Call back number [phone]. Tks

## 2020-12-08 ENCOUNTER — TELEPHONE (OUTPATIENT)
Dept: FAMILY MEDICINE | Facility: CLINIC | Age: 55
End: 2020-12-08

## 2020-12-08 NOTE — TELEPHONE ENCOUNTER
Pt's wife advised. She states she just found out it went to [facility] so she is going to get it from there.       ----- Message from Kassandra Ornelas sent at 12/8/2020  1:10 PM CST -----  Regarding: pt  Pt called in regards to medication refill. Pt is checking the status. Pt states the office was going to send it to the pharmacy. Please call back at [phone]

## 2021-08-25 ENCOUNTER — OFFICE VISIT (OUTPATIENT)
Dept: FAMILY MEDICINE | Facility: CLINIC | Age: 56
End: 2021-08-25
Payer: MEDICAID

## 2021-08-25 ENCOUNTER — TELEPHONE (OUTPATIENT)
Dept: SURGERY | Facility: CLINIC | Age: 56
End: 2021-08-25

## 2021-08-25 VITALS
HEIGHT: 73 IN | RESPIRATION RATE: 18 BRPM | BODY MASS INDEX: 32.6 KG/M2 | WEIGHT: 246 LBS | OXYGEN SATURATION: 95 % | SYSTOLIC BLOOD PRESSURE: 126 MMHG | HEART RATE: 93 BPM | DIASTOLIC BLOOD PRESSURE: 77 MMHG

## 2021-08-25 DIAGNOSIS — Z76.89 ESTABLISHING CARE WITH NEW DOCTOR, ENCOUNTER FOR: ICD-10-CM

## 2021-08-25 DIAGNOSIS — G89.29 CHRONIC RIGHT SHOULDER PAIN: ICD-10-CM

## 2021-08-25 DIAGNOSIS — Z00.00 PREVENTATIVE HEALTH CARE: Primary | ICD-10-CM

## 2021-08-25 DIAGNOSIS — L40.50 PSORIATIC ARTHRITIS: ICD-10-CM

## 2021-08-25 DIAGNOSIS — Z12.11 ENCOUNTER FOR SCREENING COLONOSCOPY: ICD-10-CM

## 2021-08-25 DIAGNOSIS — Z12.11 ENCOUNTER FOR SCREENING COLONOSCOPY: Primary | ICD-10-CM

## 2021-08-25 DIAGNOSIS — M77.12 LEFT LATERAL EPICONDYLITIS: ICD-10-CM

## 2021-08-25 DIAGNOSIS — R00.2 PALPITATIONS: ICD-10-CM

## 2021-08-25 DIAGNOSIS — I10 ESSENTIAL HYPERTENSION: ICD-10-CM

## 2021-08-25 DIAGNOSIS — Z12.5 SCREENING FOR MALIGNANT NEOPLASM OF PROSTATE: ICD-10-CM

## 2021-08-25 DIAGNOSIS — M25.511 CHRONIC RIGHT SHOULDER PAIN: ICD-10-CM

## 2021-08-25 PROCEDURE — 99215 OFFICE O/P EST HI 40 MIN: CPT | Mod: S$GLB,,, | Performed by: NURSE PRACTITIONER

## 2021-08-25 PROCEDURE — 99215 PR OFFICE/OUTPT VISIT, EST, LEVL V, 40-54 MIN: ICD-10-PCS | Mod: S$GLB,,, | Performed by: NURSE PRACTITIONER

## 2021-08-25 RX ORDER — MELOXICAM 15 MG/1
15 TABLET ORAL DAILY
Qty: 30 TABLET | Refills: 1 | Status: SHIPPED | OUTPATIENT
Start: 2021-08-25

## 2021-08-25 RX ORDER — METOPROLOL SUCCINATE 25 MG/1
25 TABLET, EXTENDED RELEASE ORAL DAILY
Qty: 90 TABLET | Refills: 1 | Status: SHIPPED | OUTPATIENT
Start: 2021-08-25 | End: 2021-09-29 | Stop reason: SDUPTHER

## 2021-08-25 RX ORDER — LISINOPRIL AND HYDROCHLOROTHIAZIDE 12.5; 2 MG/1; MG/1
1 TABLET ORAL DAILY
Qty: 90 TABLET | Refills: 1 | Status: SHIPPED | OUTPATIENT
Start: 2021-08-25 | End: 2022-11-02

## 2021-09-29 DIAGNOSIS — R00.2 PALPITATIONS: ICD-10-CM

## 2021-09-29 RX ORDER — METOPROLOL SUCCINATE 25 MG/1
25 TABLET, EXTENDED RELEASE ORAL DAILY
Qty: 90 TABLET | Refills: 1 | Status: SHIPPED | OUTPATIENT
Start: 2021-09-29 | End: 2022-04-18